# Patient Record
Sex: FEMALE
[De-identification: names, ages, dates, MRNs, and addresses within clinical notes are randomized per-mention and may not be internally consistent; named-entity substitution may affect disease eponyms.]

---

## 2022-06-23 ENCOUNTER — APPOINTMENT (OUTPATIENT)
Dept: ORTHOPEDIC SURGERY | Facility: CLINIC | Age: 77
End: 2022-06-23
Payer: MEDICARE

## 2022-06-23 VITALS — WEIGHT: 136 LBS | HEIGHT: 64 IN | BODY MASS INDEX: 23.22 KG/M2

## 2022-06-23 DIAGNOSIS — M17.11 UNILATERAL PRIMARY OSTEOARTHRITIS, RIGHT KNEE: ICD-10-CM

## 2022-06-23 DIAGNOSIS — I10 ESSENTIAL (PRIMARY) HYPERTENSION: ICD-10-CM

## 2022-06-23 DIAGNOSIS — Z86.39 PERSONAL HISTORY OF OTHER ENDOCRINE, NUTRITIONAL AND METABOLIC DISEASE: ICD-10-CM

## 2022-06-23 PROCEDURE — 73564 X-RAY EXAM KNEE 4 OR MORE: CPT | Mod: RT

## 2022-06-23 PROCEDURE — J3490M: CUSTOM

## 2022-06-23 PROCEDURE — 20610 DRAIN/INJ JOINT/BURSA W/O US: CPT | Mod: RT

## 2022-06-23 PROCEDURE — 99214 OFFICE O/P EST MOD 30 MIN: CPT | Mod: 25

## 2022-06-23 NOTE — IMAGING
[Right] : right knee [All Views] : anteroposterior, lateral, skyline, and anteroposterior standing [de-identified] : The patient is a well appearing 77 year old female of their stated age.\par Patient ambulates with a normal gait.\par Negative straight leg raise bilateral\par \par Effected Knee: right \par ROM: 0-145 degrees\par Lachman: Negative\par Pivot Shift: Negative\par Anterior Drawer: Negative\par Posterior Drawer / Sag:Negative\par Varus Stress 0 degrees: Stable\par Varus Stress 30 degrees: Stable\par Valgus Stress 0 degrees: Stable\par Valgus Stress 30 degrees: Stable\par Medial Alise: Negative\par Lateral Alise: Negative\par Patella Glide: 2+\par Patella Apprehension: Negative\par Patella Grind: +\par Palpation:\par Medial Joint Line: tender\par Lateral Joint Line: Nontender\par Medial Collateral Ligament: Nontender\par Lateral Collateral Ligament/PLC: Nontender\par Distal Femur: Nontender\par Proximal Tibia: Nontender\par Tibial Tubercle: Nontender\par Distal Pole Patella: Nontender\par Quadriceps Tendon: Nontender & Intact\par Patella Tendon: Nontender & Intact\par Medial Distal Hamstring/PES: Nontender\par Lateral Distal Hamstring: Nontender & Stable\par Iliotibial Band: Nontender\par Medial Patellofemoral Ligament: Nontender\par Proximal GSC-Plantaris: Nontender\par Calf: Supple & Nontender\par Inspection:\par Deformity: No\par Erythema: No\par Ecchymosis: No\par Abrasions: No\par Effusion: No\par Prepatella Bursitis: No\par Neurologic Exam:\par Sensation L4-S1: Grossly Intact\par Motor Exam:\par Quadriceps: 5 out of 5\par Hamstrings: 5 out of 5\par EHL: 5 out of 5\par FHL: 5 out of 5\par TA: 5 out of 5\par GS: 5 out of 5\par Circulatory/Pulses:\par Dorsalis Pedis: 2+\par Posterior Tibialis: 2+\par Additional Pertinent Findings: None\par Contralateral Knee: \par ROM: 0-145 degrees\par Other Pertinent Findings: None \par \par Assessment: The patient is a 76 year old female with right knee OA.\par \par The patient’s condition is chronic and aggravated \par Documents/Results Reviewed Today: XR right knee\par Tests/Studies Independently Interpreted Today: XR right knee reveal medial compartment narrowing \par Pertinent findings include: medial joint line tenderness, + patellofemoral crepitus\par Confounding medical conditions/concerns: thyroid and HBP\par \par Plan: Discussed treatment options. Patient elected to receive a 9 & 1 cortisone injection today. Risks benefits and alternatives were discussed. \par Tests Ordered: none \par Prescription Medications Ordered: None \par Braces/DME Ordered: None \par Activity/Work/Sports Status: retired \par Additional Instructions: activity as tolerated \par Follow-Up: prn \par \par Procedure Note: Musculoskeletal Injection\par  \par Diagnosis: right knee OA\par  \par Procedure: right knee superolateral arthroscopic portal 9/1 CSI \par  \par Indication:  The patient has had persistent pain despite conservative treatment.  Risks, benefits and alternatives to procedure were discussed; all questions were answered to the patient's apparent satisfaction and informed consent obtained.  The patient denied prior problems with local anesthetics, injectable cortisones, chicken allergy, coagulopathy and no relevant drug or preservative allergies or sensitivities.\par  \par The area of injection was prepared in a sterile fashion.  Prior to injection a 'Time Out' was conducted in accordance with [facility] policy and the site and nature of procedure verified with the patient.\par  \par Procedure:\par  \par The procedure was carried out utilizing sterile technique from a superolateral arthroscopic portal position.\par  \par 0]cc of  clear synovial fluid was aspirated.\par The specimen:\par (X) appeared benign and was discarded\par ( ) was sent for Culture / Cell Count / Crystal analysis / [_].]\par  \par Injection into the target area with care taken to aspirate frequently to minimize the risk of intravascular injection was performed with:\par  \par ( ) 1cc of Depomedrol (80mg/ml)\par (X) 1cc of Dexamethasone (10mg/ml)\par ( ) 1cc of Toradol (30mg/ml)\par (X) 9cc of 0.5% Bupivacaine\par ( ) 1cc of 1% Lidocaine\par ( ) 5cc of 32mg Zilretta, prepared and diluted per  instructions\par ( ) 2 cc of Hylan G-F 20 (Synvisc) 16mg/2ml\par ( ) 6 cc of Hylan G-F 20 (SynvisoOne) 16mg/2ml\par ( ) 2cc of Euflexxa\par ( ) 2cc of Orthovisc\par ( ) 2cc of GelOne\par ( ) 3cc of Durolane (20mg/ml)\par  \par Patient tolerated the procedure well and direct pressure was applied for hemostasis. The patient was reminded of potential post-injection risks including, but not limited to, delayed hypersensitivity reactions and/or infection.  The patient verified that they had the office and the Emergency Room's contact information if any problems should arise.  After several minutes, the patient informed me that they felt fine and was released from the office.\par \par \par \par The patient's current medication management of their orthopedic diagnosis was reviewed today:\par \par (1) We discussed a comprehensive treatment plan that included possible pharmaceutical management involving the use of prescription strength medications including but not limited to options such as oral Naprosyn 500mg BID, once daily Meloxicam 15 mg, or 500-650 mg Tylenol versus over the counter oral medications and topical prescription NSAID Pennsaid vs over the counter Voltaren gel.\par \par (2) There is a moderate risk of morbidity with further treatment, especially from use of prescription strength medications and possible side effects of these medications which include upset stomach with oral medications, skin reactions to topical medications and cardiac/renal issues with long term use.\par \par (3) I recommended that the patient follow-up with their medical physician to discuss any significant specific potential issues with long term medication use such as interactions with current medications or with exacerbation of underlying medical comorbidities.\par \par (4) The benefits and risks associated with use of injectable, oral or topical, prescription and over the counter anti-inflammatory medications were discussed with the patient. The patient voiced understanding of the risks including but not limited to bleeding, stroke, kidney dysfunction, heart disease, and were referred to the black box warning label for further information.\par \par Luisana BEAULIEU attest that this documentation has been prepared under the direction and in the presence of provider Dr. William.\par \par The documentation recorded by the scribe accurately reflects the service I personally performed and the decisions made by me.\par The patient was seen by me under the direct supervision of Dr. Celestino William\par  [FreeTextEntry9] : medial compartment narrowing

## 2022-06-23 NOTE — HISTORY OF PRESENT ILLNESS
[de-identified] : The patient is a 75 year old right hand dominant female who presents today complaining of right knee pain. \par Date of Injury/Onset: 06/19/2022\par Pain: At Rest: 6/10 \par With Activity: 9/10 \par Mechanism of injury: Insidious\par This is NOT a Work Related Injury being treated under Worker's Compensation.\par This is NOT an athletic injury occurring associated with an interscholastic or organized sports team.\par Quality of symptoms: sharp, throbbing.\par Improves with: Ice, rest\par Worse with: walking, knee flexion, stairs, touch.\par Treatment/Imaging/Studies Since Last Visit: n/a\par Reports Available For Review Today: n/a\par Out of work/sport: _, since (retired)\par School/Sport/Position/Occupation: n/a\par Change since last visit: n/a\par Additional Information: Cortisone injection significantly helpful after last visit for ~ 1yr